# Patient Record
Sex: MALE | Race: OTHER | NOT HISPANIC OR LATINO | Employment: FULL TIME | URBAN - METROPOLITAN AREA
[De-identification: names, ages, dates, MRNs, and addresses within clinical notes are randomized per-mention and may not be internally consistent; named-entity substitution may affect disease eponyms.]

---

## 2018-09-07 ENCOUNTER — HOSPITAL ENCOUNTER (OUTPATIENT)
Facility: HOSPITAL | Age: 63
Setting detail: OBSERVATION
Discharge: HOME | End: 2018-09-10
Attending: EMERGENCY MEDICINE | Admitting: FAMILY MEDICINE
Payer: COMMERCIAL

## 2018-09-07 ENCOUNTER — APPOINTMENT (EMERGENCY)
Dept: RADIOLOGY | Facility: HOSPITAL | Age: 63
End: 2018-09-07
Payer: COMMERCIAL

## 2018-09-07 DIAGNOSIS — R07.9 CHEST PAIN: Primary | ICD-10-CM

## 2018-09-07 LAB
ALBUMIN SERPL BCP-MCNC: 3.8 G/DL (ref 3.5–5)
ALP SERPL-CCNC: 68 U/L (ref 46–116)
ALT SERPL W P-5'-P-CCNC: 51 U/L (ref 12–78)
ANION GAP SERPL CALCULATED.3IONS-SCNC: 5 MMOL/L (ref 4–13)
APTT PPP: 28 SECONDS (ref 24–36)
AST SERPL W P-5'-P-CCNC: 24 U/L (ref 5–45)
BASOPHILS # BLD AUTO: 0.03 THOUSANDS/ΜL (ref 0–0.1)
BASOPHILS NFR BLD AUTO: 0 % (ref 0–1)
BILIRUB SERPL-MCNC: 0.6 MG/DL (ref 0.2–1)
BILIRUB UR QL STRIP: NEGATIVE
BUN SERPL-MCNC: 14 MG/DL (ref 5–25)
CALCIUM SERPL-MCNC: 9 MG/DL (ref 8.3–10.1)
CHLORIDE SERPL-SCNC: 100 MMOL/L (ref 100–108)
CLARITY UR: CLEAR
CO2 SERPL-SCNC: 32 MMOL/L (ref 21–32)
COLOR UR: YELLOW
CREAT SERPL-MCNC: 1.16 MG/DL (ref 0.6–1.3)
EOSINOPHIL # BLD AUTO: 0.08 THOUSAND/ΜL (ref 0–0.61)
EOSINOPHIL NFR BLD AUTO: 1 % (ref 0–6)
ERYTHROCYTE [DISTWIDTH] IN BLOOD BY AUTOMATED COUNT: 12.6 % (ref 11.6–15.1)
GFR SERPL CREATININE-BSD FRML MDRD: 68 ML/MIN/1.73SQ M
GLUCOSE SERPL-MCNC: 171 MG/DL (ref 65–140)
GLUCOSE UR STRIP-MCNC: NEGATIVE MG/DL
HCT VFR BLD AUTO: 40.1 % (ref 36.5–49.3)
HGB BLD-MCNC: 13.6 G/DL (ref 12–17)
HGB UR QL STRIP.AUTO: NEGATIVE
IMM GRANULOCYTES # BLD AUTO: 0.02 THOUSAND/UL (ref 0–0.2)
IMM GRANULOCYTES NFR BLD AUTO: 0 % (ref 0–2)
INR PPP: 1.03 (ref 0.86–1.17)
KETONES UR STRIP-MCNC: NEGATIVE MG/DL
LEUKOCYTE ESTERASE UR QL STRIP: NEGATIVE
LIPASE SERPL-CCNC: 117 U/L (ref 73–393)
LYMPHOCYTES # BLD AUTO: 1.14 THOUSANDS/ΜL (ref 0.6–4.47)
LYMPHOCYTES NFR BLD AUTO: 12 % (ref 14–44)
MCH RBC QN AUTO: 31.6 PG (ref 26.8–34.3)
MCHC RBC AUTO-ENTMCNC: 33.9 G/DL (ref 31.4–37.4)
MCV RBC AUTO: 93 FL (ref 82–98)
MONOCYTES # BLD AUTO: 0.87 THOUSAND/ΜL (ref 0.17–1.22)
MONOCYTES NFR BLD AUTO: 9 % (ref 4–12)
NEUTROPHILS # BLD AUTO: 7.14 THOUSANDS/ΜL (ref 1.85–7.62)
NEUTS SEG NFR BLD AUTO: 78 % (ref 43–75)
NITRITE UR QL STRIP: NEGATIVE
NRBC BLD AUTO-RTO: 0 /100 WBCS
PH UR STRIP.AUTO: 5.5 [PH] (ref 4.5–8)
PLATELET # BLD AUTO: 209 THOUSANDS/UL (ref 149–390)
PLATELET # BLD AUTO: 225 THOUSANDS/UL (ref 149–390)
PMV BLD AUTO: 10.4 FL (ref 8.9–12.7)
PMV BLD AUTO: 9.8 FL (ref 8.9–12.7)
POTASSIUM SERPL-SCNC: 3.3 MMOL/L (ref 3.5–5.3)
PROT SERPL-MCNC: 7.1 G/DL (ref 6.4–8.2)
PROT UR STRIP-MCNC: NEGATIVE MG/DL
PROTHROMBIN TIME: 13.4 SECONDS (ref 11.8–14.2)
RBC # BLD AUTO: 4.3 MILLION/UL (ref 3.88–5.62)
SODIUM SERPL-SCNC: 137 MMOL/L (ref 136–145)
SP GR UR STRIP.AUTO: >=1.03 (ref 1–1.03)
TROPONIN I SERPL-MCNC: <0.02 NG/ML
TROPONIN I SERPL-MCNC: <0.02 NG/ML
UROBILINOGEN UR QL STRIP.AUTO: 0.2 E.U./DL
WBC # BLD AUTO: 9.28 THOUSAND/UL (ref 4.31–10.16)

## 2018-09-07 PROCEDURE — 85025 COMPLETE CBC W/AUTO DIFF WBC: CPT | Performed by: EMERGENCY MEDICINE

## 2018-09-07 PROCEDURE — 81003 URINALYSIS AUTO W/O SCOPE: CPT | Performed by: EMERGENCY MEDICINE

## 2018-09-07 PROCEDURE — 96374 THER/PROPH/DIAG INJ IV PUSH: CPT

## 2018-09-07 PROCEDURE — 85730 THROMBOPLASTIN TIME PARTIAL: CPT | Performed by: EMERGENCY MEDICINE

## 2018-09-07 PROCEDURE — 84484 ASSAY OF TROPONIN QUANT: CPT | Performed by: NURSE PRACTITIONER

## 2018-09-07 PROCEDURE — 85610 PROTHROMBIN TIME: CPT | Performed by: EMERGENCY MEDICINE

## 2018-09-07 PROCEDURE — 93005 ELECTROCARDIOGRAM TRACING: CPT

## 2018-09-07 PROCEDURE — 80053 COMPREHEN METABOLIC PANEL: CPT | Performed by: EMERGENCY MEDICINE

## 2018-09-07 PROCEDURE — 99285 EMERGENCY DEPT VISIT HI MDM: CPT

## 2018-09-07 PROCEDURE — 84484 ASSAY OF TROPONIN QUANT: CPT | Performed by: EMERGENCY MEDICINE

## 2018-09-07 PROCEDURE — 96361 HYDRATE IV INFUSION ADD-ON: CPT

## 2018-09-07 PROCEDURE — 36415 COLL VENOUS BLD VENIPUNCTURE: CPT | Performed by: EMERGENCY MEDICINE

## 2018-09-07 PROCEDURE — 83690 ASSAY OF LIPASE: CPT | Performed by: EMERGENCY MEDICINE

## 2018-09-07 PROCEDURE — 71046 X-RAY EXAM CHEST 2 VIEWS: CPT

## 2018-09-07 PROCEDURE — 85049 AUTOMATED PLATELET COUNT: CPT | Performed by: NURSE PRACTITIONER

## 2018-09-07 RX ORDER — ACETAMINOPHEN 325 MG/1
650 TABLET ORAL EVERY 4 HOURS PRN
Status: DISCONTINUED | OUTPATIENT
Start: 2018-09-07 | End: 2018-09-10 | Stop reason: HOSPADM

## 2018-09-07 RX ORDER — ASPIRIN 81 MG/1
81 TABLET ORAL DAILY
Status: DISCONTINUED | OUTPATIENT
Start: 2018-09-08 | End: 2018-09-10 | Stop reason: HOSPADM

## 2018-09-07 RX ORDER — ASPIRIN 81 MG/1
324 TABLET, CHEWABLE ORAL ONCE
Status: COMPLETED | OUTPATIENT
Start: 2018-09-07 | End: 2018-09-07

## 2018-09-07 RX ORDER — METOPROLOL SUCCINATE 25 MG/1
25 TABLET, EXTENDED RELEASE ORAL DAILY
Status: DISCONTINUED | OUTPATIENT
Start: 2018-09-08 | End: 2018-09-10 | Stop reason: HOSPADM

## 2018-09-07 RX ORDER — ONDANSETRON 2 MG/ML
4 INJECTION INTRAMUSCULAR; INTRAVENOUS EVERY 6 HOURS PRN
Status: DISCONTINUED | OUTPATIENT
Start: 2018-09-07 | End: 2018-09-10 | Stop reason: HOSPADM

## 2018-09-07 RX ORDER — LANSOPRAZOLE 30 MG/1
30 CAPSULE, DELAYED RELEASE ORAL DAILY
COMMUNITY

## 2018-09-07 RX ORDER — ASPIRIN 81 MG/1
81 TABLET ORAL DAILY
COMMUNITY

## 2018-09-07 RX ORDER — METOPROLOL SUCCINATE 50 MG/1
25 TABLET, EXTENDED RELEASE ORAL DAILY
COMMUNITY

## 2018-09-07 RX ORDER — ATORVASTATIN CALCIUM 40 MG/1
40 TABLET, FILM COATED ORAL DAILY
COMMUNITY

## 2018-09-07 RX ORDER — LOSARTAN POTASSIUM AND HYDROCHLOROTHIAZIDE 25; 100 MG/1; MG/1
1 TABLET ORAL DAILY
COMMUNITY

## 2018-09-07 RX ORDER — ATORVASTATIN CALCIUM 40 MG/1
40 TABLET, FILM COATED ORAL DAILY
Status: DISCONTINUED | OUTPATIENT
Start: 2018-09-08 | End: 2018-09-10 | Stop reason: HOSPADM

## 2018-09-07 RX ORDER — HEPARIN SODIUM 5000 [USP'U]/ML
5000 INJECTION, SOLUTION INTRAVENOUS; SUBCUTANEOUS EVERY 8 HOURS SCHEDULED
Status: DISCONTINUED | OUTPATIENT
Start: 2018-09-07 | End: 2018-09-10 | Stop reason: HOSPADM

## 2018-09-07 RX ORDER — PANTOPRAZOLE SODIUM 40 MG/1
40 TABLET, DELAYED RELEASE ORAL
Status: DISCONTINUED | OUTPATIENT
Start: 2018-09-08 | End: 2018-09-10 | Stop reason: HOSPADM

## 2018-09-07 RX ADMIN — ASPIRIN 81 MG 324 MG: 81 TABLET ORAL at 18:18

## 2018-09-07 RX ADMIN — SODIUM CHLORIDE 1000 ML: 0.9 INJECTION, SOLUTION INTRAVENOUS at 18:23

## 2018-09-07 RX ADMIN — FAMOTIDINE 20 MG: 10 INJECTION, SOLUTION INTRAVENOUS at 18:20

## 2018-09-07 NOTE — ED PROVIDER NOTES
History  Chief Complaint   Patient presents with    Chest Pain     Per Pt " I was feeling CP  It felt like heaviness in my mid chest area  Hx of narrow arteries " Appears to not be in distress at this time  C/o chest pain since about 2:30pm while driving - cp lasted about 2 hours  Pt  Pulled over to the side of the road  No sob, no diaphoresis, +lightheaded, +nausea  Not worse with exertion  Pt  Just finished cipro and flagyl for diverticulitis this am   Pt  Recently stopped taking prevacid  Pt  Had a exercise stress test 1 & 1/2 weeks ago which was okay as per pt  (done in [place])  Pt  Has a cardiologist in [place]  Scheduled for a nuclear stress test on 9/20/18  No h/o dvt or PE    Pt  Has a hx of HTN, hypercholesterolemia, no h/o diabetes  No smoking  No premature CAD in the family  Prior to Admission Medications   Prescriptions Last Dose Informant Patient Reported? Taking?   aspirin (ECOTRIN LOW STRENGTH) 81 mg EC tablet   Yes Yes   Sig: Take 81 mg by mouth daily   atorvastatin (LIPITOR) 40 mg tablet   Yes Yes   Sig: Take 40 mg by mouth daily   lansoprazole (PREVACID) 30 mg capsule More than a month at Unknown time  Yes No   Sig: Take 30 mg by mouth daily   losartan-hydrochlorothiazide (HYZAAR) 100-25 MG per tablet   Yes Yes   Sig: Take 1 tablet by mouth daily   metoprolol succinate (TOPROL-XL) 50 mg 24 hr tablet   Yes Yes   Sig: Take 25 mg by mouth daily      Facility-Administered Medications: None       Past Medical History:   Diagnosis Date    Arthritis     Hypertension        History reviewed  No pertinent surgical history  Family History   Problem Relation Age of Onset    Arthritis Mother     Hearing loss Mother     Heart disease Father     Cancer Maternal Aunt      I have reviewed and agree with the history as documented      Social History   Substance Use Topics    Smoking status: Former Smoker    Smokeless tobacco: Never Used    Alcohol use No        Review of Systems Constitutional: Negative for appetite change, fatigue and fever  HENT: Negative for rhinorrhea and sore throat  Eyes: Negative for pain  Respiratory: Negative for cough, shortness of breath and wheezing  Cardiovascular: Positive for chest pain  Negative for leg swelling  Gastrointestinal: Positive for nausea  Negative for abdominal pain, diarrhea and vomiting  Genitourinary: Negative for dysuria and flank pain  Musculoskeletal: Negative for back pain and neck pain  Skin: Negative for rash  Neurological: Positive for light-headedness  Negative for syncope and headaches  Psychiatric/Behavioral:        Mood normal       Physical Exam  Physical Exam   Constitutional: He is oriented to person, place, and time  He appears well-developed and well-nourished  HENT:   Head: Normocephalic and atraumatic  Neck: Normal range of motion  Neck supple  Cardiovascular: Normal rate and regular rhythm  Pulmonary/Chest: Effort normal and breath sounds normal    Abdominal: Soft  There is no tenderness  Musculoskeletal: Normal range of motion  Neurological: He is alert and oriented to person, place, and time  Skin: Skin is warm and dry  Nursing note and vitals reviewed        Vital Signs  ED Triage Vitals [09/07/18 1736]   Temperature Pulse Respirations Blood Pressure SpO2   98 °F (36 7 °C) 75 16 140/66 96 %      Temp Source Heart Rate Source Patient Position - Orthostatic VS BP Location FiO2 (%)   Oral Monitor Sitting Right arm --      Pain Score       2           Vitals:    09/09/18 2300 09/10/18 0300 09/10/18 0700 09/10/18 1100   BP: 141/80 143/73 138/75 148/80   Pulse: 57 56 65 77   Patient Position - Orthostatic VS: Lying Lying Lying Lying       Visual Acuity      ED Medications  Medications   sodium chloride 0 9 % bolus 1,000 mL (0 mL Intravenous Stopped 9/7/18 1923)   famotidine (PEPCID) injection 20 mg (20 mg Intravenous Given 9/7/18 1820)   aspirin chewable tablet 324 mg (324 mg Oral Given 9/7/18 1818)   potassium chloride (K-DUR,KLOR-CON) CR tablet 40 mEq (40 mEq Oral Given 9/8/18 0523)   regadenoson (LEXISCAN) injection 0 4 mg (0 4 mg Intravenous Given 9/10/18 1134)       Diagnostic Studies  Results Reviewed     Procedure Component Value Units Date/Time    Troponin I [[id number]]  (Normal) Collected:  09/08/18 0446    Lab Status:  Final result Specimen:  Blood from Arm, Left Updated:  09/08/18 0518     Troponin I <0 02 ng/mL     Basic metabolic panel [[id number]]  (Abnormal) Collected:  09/08/18 0446    Lab Status:  Final result Specimen:  Blood from Arm, Left Updated:  09/08/18 0518     Sodium 140 mmol/L      Potassium 3 7 mmol/L      Chloride 104 mmol/L      CO2 33 (H) mmol/L      ANION GAP 3 (L) mmol/L      BUN 12 mg/dL      Creatinine 1 00 mg/dL      Glucose 106 mg/dL      Calcium 9 3 mg/dL      eGFR 81 ml/min/1 73sq m     Narrative:         National Kidney Disease Education Program recommendations are as follows:  GFR calculation is accurate only with a steady state creatinine  Chronic Kidney disease less than 60 ml/min/1 73 sq  meters  Kidney failure less than 15 ml/min/1 73 sq  meters      Magnesium [[id number]]  (Normal) Collected:  09/08/18 0446    Lab Status:  Final result Specimen:  Blood from Arm, Left Updated:  09/08/18 0518     Magnesium 2 1 mg/dL     Phosphorus [[id number]]  (Normal) Collected:  09/08/18 0446    Lab Status:  Final result Specimen:  Blood from Arm, Left Updated:  09/08/18 0518     Phosphorus 3 8 mg/dL     Lipid Panel with Direct LDL reflex [[id number]]  (Normal) Collected:  09/08/18 0446    Lab Status:  Final result Specimen:  Blood from Arm, Left Updated:  09/08/18 0518     Cholesterol 118 mg/dL      Triglycerides 99 mg/dL      HDL, Direct 40 mg/dL      LDL Calculated 58 mg/dL     CBC and differential [[id number]] Collected:  09/08/18 0446    Lab Status:  Final result Specimen:  Blood from Arm, Left Updated:  09/08/18 0456     WBC 7 46 Thousand/uL      RBC 4 16 Million/uL Hemoglobin 13 1 g/dL      Hematocrit 38 8 %      MCV 93 fL      MCH 31 5 pg      MCHC 33 8 g/dL      RDW 12 6 %      MPV 9 9 fL      Platelets 112 Thousands/uL      nRBC 0 /100 WBCs      Neutrophils Relative 64 %      Immat GRANS % 0 %      Lymphocytes Relative 24 %      Monocytes Relative 10 %      Eosinophils Relative 2 %      Basophils Relative 0 %      Neutrophils Absolute 4 81 Thousands/µL      Immature Grans Absolute 0 02 Thousand/uL      Lymphocytes Absolute 1 75 Thousands/µL      Monocytes Absolute 0 71 Thousand/µL      Eosinophils Absolute 0 14 Thousand/µL      Basophils Absolute 0 03 Thousands/µL     Troponin I [[id number]]  (Normal) Collected:  09/07/18 2311    Lab Status:  Final result Specimen:  Blood from Arm, Right Updated:  09/07/18 2338     Troponin I <0 02 ng/mL     Platelet count [[id number]]  (Normal) Collected:  09/07/18 2311    Lab Status:  Final result Specimen:  Blood from Arm, Right Updated:  09/07/18 2317     Platelets 372 Thousands/uL      MPV 9 8 fL     UA w Reflex to Microscopic [[id number]] Collected:  09/07/18 1931    Lab Status:  Final result Specimen:  Urine from Urine, Clean Catch Updated:  09/07/18 1940     Color, UA Yellow     Clarity, UA Clear     Specific Gravity, UA >=1 030     pH, UA 5 5     Leukocytes, UA Negative     Nitrite, UA Negative     Protein, UA Negative mg/dl      Glucose, UA Negative mg/dl      Ketones, UA Negative mg/dl      Urobilinogen, UA 0 2 E U /dl      Bilirubin, UA Negative     Blood, UA Negative    Comprehensive metabolic panel [[id number]]  (Abnormal) Collected:  09/07/18 1820    Lab Status:  Final result Specimen:  Blood from Arm, Right Updated:  09/07/18 1848     Sodium 137 mmol/L      Potassium 3 3 (L) mmol/L      Chloride 100 mmol/L      CO2 32 mmol/L      ANION GAP 5 mmol/L      BUN 14 mg/dL      Creatinine 1 16 mg/dL      Glucose 171 (H) mg/dL      Calcium 9 0 mg/dL      AST 24 U/L      ALT 51 U/L      Alkaline Phosphatase 68 U/L      Total Protein 7 1 g/dL Albumin 3 8 g/dL      Total Bilirubin 0 60 mg/dL      eGFR 68 ml/min/1 73sq m     Narrative:         National Kidney Disease Education Program recommendations are as follows:  GFR calculation is accurate only with a steady state creatinine  Chronic Kidney disease less than 60 ml/min/1 73 sq  meters  Kidney failure less than 15 ml/min/1 73 sq  meters      Lipase [[id number]]  (Normal) Collected:  09/07/18 1820    Lab Status:  Final result Specimen:  Blood from Arm, Right Updated:  09/07/18 1848     Lipase 117 u/L     Troponin I [[id number]]  (Normal) Collected:  09/07/18 1820    Lab Status:  Final result Specimen:  Blood from Arm, Right Updated:  09/07/18 1848     Troponin I <0 02 ng/mL     Protime-INR [[id number]]  (Normal) Collected:  09/07/18 1820    Lab Status:  Final result Specimen:  Blood from Arm, Right Updated:  09/07/18 1841     Protime 13 4 seconds      INR 1 03    APTT [[id number]]  (Normal) Collected:  09/07/18 1820    Lab Status:  Final result Specimen:  Blood from Arm, Right Updated:  09/07/18 1841     PTT 28 seconds     CBC and differential [[id number]]  (Abnormal) Collected:  09/07/18 1820    Lab Status:  Final result Specimen:  Blood from Arm, Right Updated:  09/07/18 1829     WBC 9 28 Thousand/uL      RBC 4 30 Million/uL      Hemoglobin 13 6 g/dL      Hematocrit 40 1 %      MCV 93 fL      MCH 31 6 pg      MCHC 33 9 g/dL      RDW 12 6 %      MPV 10 4 fL      Platelets 781 Thousands/uL      nRBC 0 /100 WBCs      Neutrophils Relative 78 (H) %      Immat GRANS % 0 %      Lymphocytes Relative 12 (L) %      Monocytes Relative 9 %      Eosinophils Relative 1 %      Basophils Relative 0 %      Neutrophils Absolute 7 14 Thousands/µL      Immature Grans Absolute 0 02 Thousand/uL      Lymphocytes Absolute 1 14 Thousands/µL      Monocytes Absolute 0 87 Thousand/µL      Eosinophils Absolute 0 08 Thousand/µL      Basophils Absolute 0 03 Thousands/µL                  XR chest 2 views   Final Result by Esther Nielsen MD (09/07 2024)      No acute cardiopulmonary disease  Workstation performed: [id number]                    Procedures  Procedures       Phone Contacts  ED Phone Contact    ED Course         HEART Risk Score      Most Recent Value   History  2 Filed at: 09/07/2018 2118   ECG  1 Filed at: 09/07/2018 2118   Age  1 Filed at: 09/07/2018 2118   Risk Factors  2 Filed at: 09/07/2018 2118   Troponin  0 Filed at: 09/07/2018 2118   Heart Score Risk Calculator   History  2 Filed at: 09/07/2018 2118   ECG  1 Filed at: 09/07/2018 2118   Age  1 Filed at: 09/07/2018 2118   Risk Factors  2 Filed at: 09/07/2018 2118   Troponin  0 Filed at: 09/07/2018 2118   HEART Score  6 Filed at: 09/07/2018 2118   HEART Score  6 Filed at: 09/07/2018 2118                            MDM  Number of Diagnoses or Management Options  Chest pain:      Amount and/or Complexity of Data Reviewed  Clinical lab tests: ordered and reviewed  Tests in the radiology section of CPT®: ordered and reviewed    Risk of Complications, Morbidity, and/or Mortality  Presenting problems: moderate  General comments: Patient was admitted for further workup      CritCare Time    Disposition  Final diagnoses:   Chest pain     Time reflects when diagnosis was documented in both MDM as applicable and the Disposition within this note     Time User Action Codes Description Comment    9/7/2018  7:04 PM Donovan Beebe 6 [R07 9] Chest pain       ED Disposition     ED Disposition Condition Comment    Admit  Case was discussed with Herline and the patient's admission status was agreed to be Admission Status: observation status to the service of Dr Geovani Alicea           Follow-up Information     Follow up With Specialties Details Why Contact Info    [facility] [address]  and your cardiologist [phone]          Discharge Medication List as of 9/10/2018  3:53 PM CONTINUE these medications which have NOT CHANGED    Details   aspirin (ECOTRIN LOW STRENGTH) 81 mg EC tablet Take 81 mg by mouth daily, Historical Med      atorvastatin (LIPITOR) 40 mg tablet Take 40 mg by mouth daily, Historical Med      losartan-hydrochlorothiazide (HYZAAR) 100-25 MG per tablet Take 1 tablet by mouth daily, Historical Med      metoprolol succinate (TOPROL-XL) 50 mg 24 hr tablet Take 25 mg by mouth daily, Historical Med      lansoprazole (PREVACID) 30 mg capsule Take 30 mg by mouth daily, Historical Med           No discharge procedures on file      ED Provider  Electronically Signed by           Chary Choi MD  09/12/18 3020

## 2018-09-07 NOTE — DISCHARGE INSTRUCTIONS

## 2018-09-08 PROBLEM — K21.9 GERD (GASTROESOPHAGEAL REFLUX DISEASE): Status: ACTIVE | Noted: 2018-09-08

## 2018-09-08 PROBLEM — E87.6 HYPOKALEMIA: Status: ACTIVE | Noted: 2018-09-08

## 2018-09-08 PROBLEM — I10 HYPERTENSION: Status: ACTIVE | Noted: 2018-09-08

## 2018-09-08 PROBLEM — R07.9 CHEST PAIN: Status: ACTIVE | Noted: 2018-09-08

## 2018-09-08 LAB
ANION GAP SERPL CALCULATED.3IONS-SCNC: 3 MMOL/L (ref 4–13)
ATRIAL RATE: 75 BPM
BASOPHILS # BLD AUTO: 0.03 THOUSANDS/ΜL (ref 0–0.1)
BASOPHILS NFR BLD AUTO: 0 % (ref 0–1)
BUN SERPL-MCNC: 12 MG/DL (ref 5–25)
CALCIUM SERPL-MCNC: 9.3 MG/DL (ref 8.3–10.1)
CHLORIDE SERPL-SCNC: 104 MMOL/L (ref 100–108)
CHOLEST SERPL-MCNC: 118 MG/DL (ref 50–200)
CO2 SERPL-SCNC: 33 MMOL/L (ref 21–32)
CREAT SERPL-MCNC: 1 MG/DL (ref 0.6–1.3)
EOSINOPHIL # BLD AUTO: 0.14 THOUSAND/ΜL (ref 0–0.61)
EOSINOPHIL NFR BLD AUTO: 2 % (ref 0–6)
ERYTHROCYTE [DISTWIDTH] IN BLOOD BY AUTOMATED COUNT: 12.6 % (ref 11.6–15.1)
GFR SERPL CREATININE-BSD FRML MDRD: 81 ML/MIN/1.73SQ M
GLUCOSE SERPL-MCNC: 106 MG/DL (ref 65–140)
HCT VFR BLD AUTO: 38.8 % (ref 36.5–49.3)
HDLC SERPL-MCNC: 40 MG/DL (ref 40–60)
HGB BLD-MCNC: 13.1 G/DL (ref 12–17)
IMM GRANULOCYTES # BLD AUTO: 0.02 THOUSAND/UL (ref 0–0.2)
IMM GRANULOCYTES NFR BLD AUTO: 0 % (ref 0–2)
LDLC SERPL CALC-MCNC: 58 MG/DL (ref 0–100)
LYMPHOCYTES # BLD AUTO: 1.75 THOUSANDS/ΜL (ref 0.6–4.47)
LYMPHOCYTES NFR BLD AUTO: 24 % (ref 14–44)
MAGNESIUM SERPL-MCNC: 2.1 MG/DL (ref 1.6–2.6)
MCH RBC QN AUTO: 31.5 PG (ref 26.8–34.3)
MCHC RBC AUTO-ENTMCNC: 33.8 G/DL (ref 31.4–37.4)
MCV RBC AUTO: 93 FL (ref 82–98)
MONOCYTES # BLD AUTO: 0.71 THOUSAND/ΜL (ref 0.17–1.22)
MONOCYTES NFR BLD AUTO: 10 % (ref 4–12)
NEUTROPHILS # BLD AUTO: 4.81 THOUSANDS/ΜL (ref 1.85–7.62)
NEUTS SEG NFR BLD AUTO: 64 % (ref 43–75)
NRBC BLD AUTO-RTO: 0 /100 WBCS
P AXIS: 74 DEGREES
PHOSPHATE SERPL-MCNC: 3.8 MG/DL (ref 2.3–4.1)
PLATELET # BLD AUTO: 198 THOUSANDS/UL (ref 149–390)
PMV BLD AUTO: 9.9 FL (ref 8.9–12.7)
POTASSIUM SERPL-SCNC: 3.7 MMOL/L (ref 3.5–5.3)
PR INTERVAL: 198 MS
QRS AXIS: 45 DEGREES
QRSD INTERVAL: 92 MS
QT INTERVAL: 368 MS
QTC INTERVAL: 410 MS
RBC # BLD AUTO: 4.16 MILLION/UL (ref 3.88–5.62)
SODIUM SERPL-SCNC: 140 MMOL/L (ref 136–145)
T WAVE AXIS: 64 DEGREES
TRIGL SERPL-MCNC: 99 MG/DL
TROPONIN I SERPL-MCNC: <0.02 NG/ML
VENTRICULAR RATE: 75 BPM
WBC # BLD AUTO: 7.46 THOUSAND/UL (ref 4.31–10.16)

## 2018-09-08 PROCEDURE — 83735 ASSAY OF MAGNESIUM: CPT | Performed by: NURSE PRACTITIONER

## 2018-09-08 PROCEDURE — 84484 ASSAY OF TROPONIN QUANT: CPT | Performed by: NURSE PRACTITIONER

## 2018-09-08 PROCEDURE — 93010 ELECTROCARDIOGRAM REPORT: CPT | Performed by: INTERNAL MEDICINE

## 2018-09-08 PROCEDURE — 99219 PR INITIAL OBSERVATION CARE/DAY 50 MINUTES: CPT | Performed by: FAMILY MEDICINE

## 2018-09-08 PROCEDURE — 80048 BASIC METABOLIC PNL TOTAL CA: CPT | Performed by: NURSE PRACTITIONER

## 2018-09-08 PROCEDURE — 80061 LIPID PANEL: CPT | Performed by: NURSE PRACTITIONER

## 2018-09-08 PROCEDURE — 85025 COMPLETE CBC W/AUTO DIFF WBC: CPT | Performed by: NURSE PRACTITIONER

## 2018-09-08 PROCEDURE — 84100 ASSAY OF PHOSPHORUS: CPT | Performed by: NURSE PRACTITIONER

## 2018-09-08 PROCEDURE — 99244 OFF/OP CNSLTJ NEW/EST MOD 40: CPT | Performed by: INTERNAL MEDICINE

## 2018-09-08 RX ORDER — POTASSIUM CHLORIDE 20 MEQ/1
40 TABLET, EXTENDED RELEASE ORAL ONCE
Status: COMPLETED | OUTPATIENT
Start: 2018-09-08 | End: 2018-09-08

## 2018-09-08 RX ORDER — LANOLIN ALCOHOL/MO/W.PET/CERES
6 CREAM (GRAM) TOPICAL
Status: DISCONTINUED | OUTPATIENT
Start: 2018-09-08 | End: 2018-09-10 | Stop reason: HOSPADM

## 2018-09-08 RX ADMIN — LOSARTAN POTASSIUM: 50 TABLET, FILM COATED ORAL at 09:19

## 2018-09-08 RX ADMIN — ACETAMINOPHEN 650 MG: 325 TABLET, FILM COATED ORAL at 23:51

## 2018-09-08 RX ADMIN — HEPARIN SODIUM 5000 UNITS: 5000 INJECTION, SOLUTION INTRAVENOUS; SUBCUTANEOUS at 21:33

## 2018-09-08 RX ADMIN — PANTOPRAZOLE SODIUM 40 MG: 40 TABLET, DELAYED RELEASE ORAL at 05:25

## 2018-09-08 RX ADMIN — MELATONIN TAB 3 MG 6 MG: 3 TAB at 00:48

## 2018-09-08 RX ADMIN — HEPARIN SODIUM 5000 UNITS: 5000 INJECTION, SOLUTION INTRAVENOUS; SUBCUTANEOUS at 15:47

## 2018-09-08 RX ADMIN — PSYLLIUM HUSK 1 PACKET: 3.4 POWDER ORAL at 21:33

## 2018-09-08 RX ADMIN — HEPARIN SODIUM 5000 UNITS: 5000 INJECTION, SOLUTION INTRAVENOUS; SUBCUTANEOUS at 00:48

## 2018-09-08 RX ADMIN — POTASSIUM CHLORIDE 40 MEQ: 1500 TABLET, EXTENDED RELEASE ORAL at 05:23

## 2018-09-08 RX ADMIN — ASPIRIN 81 MG: 81 TABLET, COATED ORAL at 09:20

## 2018-09-08 RX ADMIN — HEPARIN SODIUM 5000 UNITS: 5000 INJECTION, SOLUTION INTRAVENOUS; SUBCUTANEOUS at 05:23

## 2018-09-08 RX ADMIN — MELATONIN TAB 3 MG 6 MG: 3 TAB at 21:34

## 2018-09-08 RX ADMIN — ATORVASTATIN CALCIUM 40 MG: 40 TABLET, FILM COATED ORAL at 09:20

## 2018-09-08 NOTE — PLAN OF CARE
CARDIOVASCULAR - ADULT     Maintains optimal cardiac output and hemodynamic stability Progressing     Absence of cardiac dysrhythmias or at baseline rhythm Progressing        DISCHARGE PLANNING     Discharge to home or other facility with appropriate resources Progressing        INFECTION - ADULT     Absence or prevention of progression during hospitalization Progressing     Absence of fever/infection during neutropenic period Progressing        Knowledge Deficit     Patient/family/caregiver demonstrates understanding of disease process, treatment plan, medications, and discharge instructions Progressing        METABOLIC, FLUID AND ELECTROLYTES - ADULT     Electrolytes maintained within normal limits Progressing     Fluid balance maintained Progressing     Glucose maintained within target range Progressing        MUSCULOSKELETAL - ADULT     Maintain or return mobility to safest level of function Progressing     Maintain proper alignment of affected body part Progressing        NEUROSENSORY - ADULT     Achieves stable or improved neurological status Progressing     Achieves maximal functionality and self care Progressing        PAIN - ADULT     Verbalizes/displays adequate comfort level or baseline comfort level Progressing        Potential for Falls     Patient will remain free of falls Progressing        RESPIRATORY - ADULT     Achieves optimal ventilation and oxygenation Progressing        SAFETY ADULT     Maintain or return to baseline ADL function Progressing     Maintain or return mobility status to optimal level Progressing        SKIN/TISSUE INTEGRITY - ADULT     Skin integrity remains intact Progressing     Incision(s), wounds(s) or drain site(s) healing without S/S of infection Progressing     Oral mucous membranes remain intact Progressing

## 2018-09-08 NOTE — ASSESSMENT & PLAN NOTE
Pre hospital, chest pressure nonradiating, associated with nausea and dizziness  Patient does have previous history of chest pain, with recent abnormal stress test, scheduled for nuclear test   Trop negative, will trend  Chest x-ray reviewed, no acute findings  Telemetry  Will hold echo pending cardiology consult  Consult Cardiology for further evaluation and management  Monitor associated risk factors

## 2018-09-08 NOTE — H&P
H&P- Quentin Rodriguez 1955, 66 y o  male MRN: [id number]    Unit/Bed#: -01 Encounter: [id number]    Primary Care Provider: No primary care provider on file  Date and time admitted to hospital: 9/7/2018  5:35 PM        * Chest pain   Assessment & Plan    Pre hospital, chest pressure nonradiating, associated with nausea and dizziness  Patient does have previous history of chest pain, with recent abnormal stress test, scheduled for nuclear test   Trop negative, will trend  Chest x-ray reviewed, no acute findings  Telemetry  Will hold echo pending cardiology consult  Consult Cardiology for further evaluation and management  Monitor associated risk factors  Hypokalemia   Assessment & Plan    Potassium 3 3 on admission  Will replete  Monitor electrolytes in the a keyona Lopes [place] Hypertension   Assessment & Plan    Blood pressure stable  Continue Lopressor Hyzaar  Continue to monitor  VTE Prophylaxis: Heparin  / sequential compression device   Code Status: Full Code  POLST: POLST form is not discussed and not completed at this time  Discussion with family: No Family at bed side    Anticipated Length of Stay:  Patient will be admitted on an Observation basis with an anticipated length of stay of  Less than 2 midnights  Justification for Hospital Stay: Chest Pain R/O [facility], [place], operated by [facility]     Total Time for Visit, including Counseling / Coordination of Care: 45 minutes  Greater than 50% of this total time spent on direct patient counseling and coordination of care  Chief Complaint:   Chest Pain    History of Present Illness:    Quentin Rodriguez is a 66 y o  male with HTN who presents with chest pain, pressure nonradiating associated with nausea and dizziness  Patient states pain was "intense, had to pull over to call 911   patient denies shortness of breath, nausea, diaphoresis    Patient reports has had similar pain in the past recent stress test abnormal, scheduled for nuclear test  Patient report extensive cardiac workup  Review of Systems:    Review of Systems   Respiratory: Negative for shortness of breath  Cardiovascular: Positive for chest pain  Negative for palpitations and leg swelling  Gastrointestinal: Positive for nausea  Neurological: Positive for dizziness  All other systems reviewed and are negative  Past Medical and Surgical History:     Past Medical History:   Diagnosis Date    Arthritis     Hypertension        History reviewed  No pertinent surgical history  Meds/Allergies:    Prior to Admission medications    Medication Sig Start Date End Date Taking? Authorizing Provider   aspirin (ECOTRIN LOW STRENGTH) 81 mg EC tablet Take 81 mg by mouth daily   Yes Historical Provider, MD   atorvastatin (LIPITOR) 40 mg tablet Take 40 mg by mouth daily   Yes Historical Provider, MD   losartan-hydrochlorothiazide (HYZAAR) 100-25 MG per tablet Take 1 tablet by mouth daily   Yes Historical Provider, MD   metoprolol succinate (TOPROL-XL) 50 mg 24 hr tablet Take 25 mg by mouth daily   Yes Historical Provider, MD   lansoprazole (PREVACID) 30 mg capsule Take 30 mg by mouth daily    Historical Provider, MD     I have reviewed home medications with patient personally  Allergies: No Known Allergies    Social History:     Marital Status: /Civil Union   Occupation: Full time employee  Patient Pre-hospital Living Situation: Home from out of town    Patient Pre-hospital Level of Mobility: Independent  Patient Pre-hospital Diet Restrictions: none  Substance Use History:   History   Alcohol Use No     History   Smoking Status    Former Smoker   Smokeless Tobacco    Never Used     History   Drug Use No       Family History:    non-contributory    Physical Exam:     Vitals:   Blood Pressure: 130/75 (09/07/18 2240)  Pulse: 60 (09/07/18 2236)  Temperature: 97 8 °F (36 6 °C) (09/07/18 2236)  Temp Source: Oral (09/07/18 2236)  Respirations: 18 (09/07/18 2236)  Height: 6' 5" (195 6 cm) (09/07/18 2236)  Weight - Scale: 104 kg (230 lb) (09/07/18 1736)  SpO2: 97 % (09/07/18 2236)    Physical Exam   Constitutional: He is oriented to person, place, and time  He appears well-developed and well-nourished  No distress  HENT:   Head: Normocephalic and atraumatic  Mouth/Throat: Oropharynx is clear and moist    Neck: Normal range of motion  Neck supple  No thyromegaly present  Cardiovascular: Normal rate, regular rhythm, normal heart sounds and intact distal pulses  No murmur heard  ressolved   Pulmonary/Chest: Effort normal and breath sounds normal  No respiratory distress  He has no wheezes  He exhibits no tenderness  Abdominal: Soft  Bowel sounds are normal  He exhibits no distension  There is no tenderness  Musculoskeletal: Normal range of motion  He exhibits no edema or tenderness  Lymphadenopathy:     He has no cervical adenopathy  Neurological: He is alert and oriented to person, place, and time  Skin: Skin is warm and dry  He is not diaphoretic  Psychiatric: He has a normal mood and affect  Nursing note and vitals reviewed  Additional Data:     Lab Results: I have personally reviewed pertinent reports  Results from last 7 days  Lab Units 09/07/18  2311 09/07/18  1820   WBC Thousand/uL  --  9 28   HEMOGLOBIN g/dL  --  13 6   HEMATOCRIT %  --  40 1   PLATELETS Thousands/uL 209 225   NEUTROS PCT %  --  78*   LYMPHS PCT %  --  12*   MONOS PCT %  --  9   EOS PCT %  --  1       Results from last 7 days  Lab Units 09/07/18  1820   SODIUM mmol/L 137   POTASSIUM mmol/L 3 3*   CHLORIDE mmol/L 100   CO2 mmol/L 32   BUN mg/dL 14   CREATININE mg/dL 1 16   CALCIUM mg/dL 9 0   ALK PHOS U/L 68   ALT U/L 51   AST U/L 24       Results from last 7 days  Lab Units 09/07/18  1820   INR  1 03               Imaging: I have personally reviewed pertinent reports  XR chest 2 views   Final Result by Lexi Rowell MD (09/07 2024)      No acute cardiopulmonary disease  Workstation performed: [id number]             EKG, Pathology, and Other Studies Reviewed on Admission:   · EKG: Normal Sinus    Allscripts / Epic Records Reviewed: Yes     ** Please Note: This note has been constructed using a voice recognition system   **

## 2018-09-08 NOTE — CASE MANAGEMENT
Initial Clinical Review    Admission: Date/Time/Statement: 9/7/18 @ 2119 OBSERVATION    Orders Placed This Encounter   Procedures    Place in Observation (expected length of stay for this patient is less than two midnights)     Standing Status:   Standing     Number of Occurrences:   1     Order Specific Question:   Admitting Physician     Answer:   Rufus Cranker     Order Specific Question:   Level of Care     Answer:   Med Surg [16]     ED: Date/Time/Mode of Arrival:   ED Arrival Information     Expected Arrival Acuity Means of Arrival Escorted By Service Admission Type    - 9/7/2018 17:34 Urgent Ambulance [address] Urgent    Arrival Complaint    -          Chief Complaint:   Chief Complaint   Patient presents with    Chest Pain     Per Pt " I was feeling CP  It felt like heaviness in my mid chest area  Hx of narrow arteries " Appears to not be in distress at this time  History of Cindy Vaughan is a 66 y o  male with HTN who presents with chest pain, pressure nonradiating associated with nausea and dizziness  Patient states pain was "intense, had to pull over to call 911   patient denies shortness of breath, nausea, diaphoresis    Patient reports has had similar pain in the past recent stress test abnormal, scheduled for nuclear test   Patient report extensive cardiac workup          ED Vital Signs:   ED Triage Vitals [09/07/18 1736]   Temperature Pulse Respirations Blood Pressure SpO2   98 °F (36 7 °C) 75 16 140/66 96 %      Temp Source Heart Rate Source Patient Position - Orthostatic VS BP Location FiO2 (%)   Oral Monitor Sitting Right arm --      Pain Score       2        Wt Readings from Last 1 Encounters:   09/07/18 104 kg (230 lb)       Vital Signs: WNL    Abnormal Labs/Diagnostic Test Results:     Troponin = <0 02, <0 02, Potassium = 3 3, Glucose = 171  EKG: NSR, 75 BPM    Stress Test: pending    Chest x-ray: WNL    ED Treatment:   Medication Administration from 09/07/2018 1734 to 09/07/2018 2235       Date/Time Order Dose Route Action     09/07/2018 1823 sodium chloride 0 9 % bolus 1,000 mL 1,000 mL Intravenous New Bag     09/07/2018 1820 famotidine (PEPCID) injection 20 mg 20 mg Intravenous Given     09/07/2018 1818 aspirin chewable tablet 324 mg 324 mg Oral Given          Past Medical/Surgical History:       Past Medical History:   Diagnosis Date    Arthritis     Hypertension        Admitting Diagnosis: Chest pain [R07 9]    Age/Sex: 66 y o  male    Assessment/Plan:   Chest pain   Assessment & Plan     Pre hospital, chest pressure nonradiating, associated with nausea and dizziness  Patient does have previous history of chest pain, with recent abnormal stress test, scheduled for nuclear test   Trop negative, will trend  Chest x-ray reviewed, no acute findings  Telemetry  Will hold echo pending cardiology consult  Consult Cardiology for further evaluation and management  Monitor associated risk factors              Hypokalemia   Assessment & Plan     Potassium 3 3 on admission  Will replete  Monitor electrolytes in the a m             Hypertension   Assessment & Plan     Blood pressure stable  Continue Lopressor, Hyzaar  Continue to monitor              VTE Prophylaxis: Heparin  / sequential compression device   Code Status: Full Code  POLST: POLST form is not discussed and not completed at this time  Discussion with family: No Family at bed side     Anticipated Length of Stay:  Patient will be admitted on an Observation basis with an anticipated length of stay of  Less than 2 midnights  Justification for Hospital Stay: Chest Pain R/O [facility]     Admission Orders:  Cardiology consult, stress test, continuous pulse oximetry, telemetry, activity as tolerated, sequential compression device      Scheduled Meds:   Current Facility-Administered Medications:  acetaminophen 650 mg Oral Q4H PRN   aspirin 81 mg Oral Daily   atorvastatin 40 mg Oral Daily heparin (porcine) 5,000 Units Subcutaneous Q8H [address]   losartan potassium-hydrochlorothiazide (HYZAAR 100/25) combo dose  Oral Daily   melatonin 6 mg Oral HS   metoprolol succinate 25 mg Oral Daily   ondansetron 4 mg Intravenous Q6H PRN   pantoprazole 40 mg Oral Early Morning     =====================================================  9/8 Cardiology Consult:    - Nuclear stress test for Monday to r/o coronary ischemia  -Continue ASA, statin, Toprol     ===========================================================  Continued Stay Review    Date: 9/8/18 @ 1507    Vital Signs: /72 (BP Location: Left arm)   Pulse 71   Temp 98 2 °F (36 8 °C) (Oral)   Resp 18   Ht 6' 5" (1 956 m)   Wt 104 kg (230 lb)   SpO2 95%   BMI 27 27 kg/m²     Medications:   Scheduled Meds:   Current Facility-Administered Medications:  acetaminophen 650 mg Oral Q4H PRN   aspirin 81 mg Oral Daily   atorvastatin 40 mg Oral Daily   heparin (porcine) 5,000 Units Subcutaneous Q8H [address]   losartan potassium-hydrochlorothiazide (HYZAAR 100/25) combo dose  Oral Daily   melatonin 6 mg Oral HS   metoprolol succinate 25 mg Oral Daily   ondansetron 4 mg Intravenous Q6H PRN   pantoprazole 40 mg Oral Early Morning     Abnormal Labs/Diagnostic Results:     Troponin = <0 02    Age/Sex: 66 y o  male     Assessment/Plan: 9/8/18 @ 1507    Discharge Plan: TBD                    Thank you,  [address]  Utilization Review Department  Phone: [phone]; Fax [phone]  ATTENTION: Please call with any questions or concerns to [phone]  and carefully follow the prompts so that you are directed to the right person  Send all requests for admission clinical reviews, approved or denied determinations and any other requests to fax [phone]   All voicemails are confidential

## 2018-09-08 NOTE — CONSULTS
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 who has a history of hypertension, hyperlipidemia, family history of CAD presenting with chest pain since yesterday  Patient states that he was driving when he felt a substernal chest pain as if something is "surging up his chest and to his head "  Chest pain nonradiating, lasting about an hour, intermittent and waxing and waning in intensity  Patient pulled over and called 911 and was brought to the emergency department via ambulance  Chest pain relieved with Nexium  Denies SOB, lightheadedness, palpitations, leg swelling, syncope  Patient has no prior cardiac history, however states that he had an echocardiogram and exercise stress test within the last year  He states that he was told that his echocardiogram was fine, however his exercise stress test was normal and he was scheduled for a nuclear stress test  Father had CAD, diagnosed at age 62s  REVIEW OF SYSTEMS:  Constitutional:  Denies fever or chills   Eyes:  Denies change in visual acuity   HENT:  Denies nasal congestion or sore throat   Respiratory:  Denies cough or shortness of breath   Cardiovascular:  +chest pain   Denies edema   GI:  Denies abdominal pain, nausea, vomiting, bloody stools or diarrhea   :  Denies dysuria, frequency, difficulty in micturition and nocturia  Musculoskeletal:  Denies back pain or joint pain   Neurologic:  Denies headache, focal weakness or sensory changes   Endocrine:  Denies polyuria or polydipsia   Lymphatic:  Denies swollen glands   Psychiatric:  Denies depression or anxiety     Historical Information   Past Medical History:   Diagnosis Date    Arthritis     Hypertension      History reviewed  No pertinent surgical history  History   Alcohol Use No     History   Drug Use No     History   Smoking Status    Former Smoker   Smokeless Tobacco    Never Used       Family History:   Family History   Problem Relation Age of Onset    Arthritis Mother     Hearing loss Mother     Heart disease Father     Cancer Maternal Aunt        MEDS & ALLERGIES:  all current active meds have been reviewed and current meds: Current Facility-Administered Medications   Medication Dose Route Frequency    acetaminophen (TYLENOL) tablet 650 mg  650 mg Oral Q4H PRN    aspirin (ECOTRIN LOW STRENGTH) EC tablet 81 mg  81 mg Oral Daily    atorvastatin (LIPITOR) tablet 40 mg  40 mg Oral Daily    heparin (porcine) subcutaneous injection 5,000 Units  5,000 Units Subcutaneous Q8H [address]    losartan potassium-hydrochlorothiazide (HYZAAR 100/25) combo dose   Oral Daily    melatonin tablet 6 mg  6 mg Oral HS    metoprolol succinate (TOPROL-XL) 24 hr tablet 25 mg  25 mg Oral Daily    ondansetron (ZOFRAN) injection 4 mg  4 mg Intravenous Q6H PRN    pantoprazole (PROTONIX) EC tablet 40 mg  40 mg Oral Early Morning        No Known Allergies    OBJECTIVE:  Vitals:   Vitals:    09/08/18 0700   BP: 155/75   Pulse: 62   Resp: 18   Temp: 97 7 °F (36 5 °C)   SpO2: 95%     Body mass index is 27 27 kg/m²      Systolic (44KCQ), NFT:995 , Min:130 , FWA:048     Diastolic (91QJQ), UAT:86, Min:66, Max:82      Intake/Output Summary (Last 24 hours) at 09/08/18 0828  Last data filed at 09/07/18 1923   Gross per 24 hour   Intake             1000 ml   Output                0 ml   Net             1000 ml     Weight (last 2 days)     Date/Time   Weight    09/07/18 1736  104 (230)            Invasive Devices     Peripheral Intravenous Line            Peripheral IV 09/07/18 Right Antecubital less than 1 day                PHYSICAL EXAMS:  General: Patient is not in acute distress, laying in the bed comfortably, awake, alert responding to commands  Head: Normocephalic, Atraumatic  HEENT: White sclera, pink conjunctiva,  PERRLA,pharynx benign  Neck:  Supple, no neck vein distention, carotids+2/+2 no bruits, thyromegaly, adenopathy  Respiratory: clear to P/A  Cardiovascular:  PMI normal, S1-S2 normal, No  Murmurs, thrills, gallops, rubs   Regular rhythm  GI:  Abdomen soft nontender   No hepatosplenomegaly, adenopathy, ascites,or rebound tenderness  Extremities: No edema, normal pulses, no calf tenderness, no joint deformities, no venous disease   Integument:  No skin rashes or ulceration  Lymphatic:  No cervical or inguinal lymphadenopathy  Neurologic:  Patient is awake alert, responding to command, well-oriented to time and place and person moving all extremities    LABORATORY RESULTS:    Results from last 7 days  Lab Units 09/08/18 0446 09/07/18  2311 09/07/18  1820   TROPONIN I ng/mL <0 02 <0 02 <0 02     CBC with diff:   Results from last 7 days  Lab Units 09/08/18  0446 09/07/18  2311 09/07/18  1820   WBC Thousand/uL 7 46  --  9 28   HEMOGLOBIN g/dL 13 1  --  13 6   HEMATOCRIT % 38 8  --  40 1   MCV fL 93  --  93   PLATELETS Thousands/uL 198 209 225   MCH pg 31 5  --  31 6   MCHC g/dL 33 8  --  33 9   RDW % 12 6  --  12 6   MPV fL 9 9 9 8 10 4   NRBC AUTO /100 WBCs 0  --  0       CMP:  Results from last 7 days  Lab Units 09/08/18  0446 09/07/18  1820   SODIUM mmol/L 140 137   POTASSIUM mmol/L 3 7 3 3*   CHLORIDE mmol/L 104 100   CO2 mmol/L 33* 32   BUN mg/dL 12 14   CREATININE mg/dL 1 00 1 16   CALCIUM mg/dL 9 3 9 0   AST U/L  --  24   ALT U/L  --  51   ALK PHOS U/L  --  68   EGFR ml/min/1 73sq m 81 68       BMP:  Results from last 7 days  Lab Units 09/08/18  0446 09/07/18  1820   SODIUM mmol/L 140 137   POTASSIUM mmol/L 3 7 3 3*   CHLORIDE mmol/L 104 100   CO2 mmol/L 33* 32   BUN mg/dL 12 14   CREATININE mg/dL 1 00 1 16   CALCIUM mg/dL 9 3 9 0 Results from last 7 days  Lab Units 09/08/18  0446   MAGNESIUM mg/dL 2 1               Results from last 7 days  Lab Units 09/07/18  1820   INR  1 03       Lipid Profile:   No results found for: CHOL  Lab Results   Component Value Date    HDL 40 09/08/2018     Lab Results   Component Value Date    LDLCALC 58 09/08/2018     Lab Results   Component Value Date    TRIG 99 09/08/2018       Cardiac testing:   No results found for this or any previous visit  No results found for this or any previous visit  No procedure found  No results found for this or any previous visit  Imaging:   I have personally reviewed pertinent reports  EKG reviewed personally:  NSR    Assessment/Plan:  1  Chest pain:  -Serial troponins negative x3  -EKG shows NSR  -CXR unremarkable  -Had ECHO done at cardiologist office in [place] within the past year  States that it was unremarkable  -Was scheduled for nuclear stress test   -Will order Nuclear stress test for Monday to r/o coronary ischemia  -Continue ASA, statin, Toprol    2  HTN: Stable, continue present regimen    3  HLD: Continue statin    Code Status: Level 1 - Full Code    Counseling / Coordination of Care  Total floor / unit time spent today 35 minutes  Greater than 50% of total time was spent with the patient and / or family counseling and / or coordination of care  A description of the counseling / coordination of care: Review of history, current assessment, development of a plan      Jeancarlos Obrien PA-C  9/8/2018,8:28 AM

## 2018-09-08 NOTE — PLAN OF CARE
CARDIOVASCULAR - ADULT     Maintains optimal cardiac output and hemodynamic stability Progressing     Absence of cardiac dysrhythmias or at baseline rhythm Progressing        METABOLIC, FLUID AND ELECTROLYTES - ADULT     Electrolytes maintained within normal limits Progressing     Fluid balance maintained Progressing     Glucose maintained within target range Progressing        MUSCULOSKELETAL - ADULT     Maintain or return mobility to safest level of function Progressing     Maintain proper alignment of affected body part Progressing        NEUROSENSORY - ADULT     Achieves stable or improved neurological status Progressing     Achieves maximal functionality and self care Progressing        Potential for Falls     Patient will remain free of falls Progressing        RESPIRATORY - ADULT     Achieves optimal ventilation and oxygenation Progressing        SKIN/TISSUE INTEGRITY - ADULT     Skin integrity remains intact Progressing     Incision(s), wounds(s) or drain site(s) healing without S/S of infection Progressing     Oral mucous membranes remain intact Progressing

## 2018-09-09 PROCEDURE — 99226 PR SBSQ OBSERVATION CARE/DAY 35 MINUTES: CPT | Performed by: FAMILY MEDICINE

## 2018-09-09 PROCEDURE — 99213 OFFICE O/P EST LOW 20 MIN: CPT | Performed by: INTERNAL MEDICINE

## 2018-09-09 RX ADMIN — HEPARIN SODIUM 5000 UNITS: 5000 INJECTION, SOLUTION INTRAVENOUS; SUBCUTANEOUS at 05:43

## 2018-09-09 RX ADMIN — HEPARIN SODIUM 5000 UNITS: 5000 INJECTION, SOLUTION INTRAVENOUS; SUBCUTANEOUS at 21:11

## 2018-09-09 RX ADMIN — LOSARTAN POTASSIUM: 50 TABLET, FILM COATED ORAL at 08:33

## 2018-09-09 RX ADMIN — PANTOPRAZOLE SODIUM 40 MG: 40 TABLET, DELAYED RELEASE ORAL at 05:43

## 2018-09-09 RX ADMIN — ATORVASTATIN CALCIUM 40 MG: 40 TABLET, FILM COATED ORAL at 08:33

## 2018-09-09 RX ADMIN — PSYLLIUM HUSK 1 PACKET: 3.4 POWDER ORAL at 08:33

## 2018-09-09 RX ADMIN — METOPROLOL SUCCINATE 25 MG: 25 TABLET, EXTENDED RELEASE ORAL at 08:33

## 2018-09-09 RX ADMIN — HEPARIN SODIUM 5000 UNITS: 5000 INJECTION, SOLUTION INTRAVENOUS; SUBCUTANEOUS at 15:34

## 2018-09-09 RX ADMIN — ASPIRIN 81 MG: 81 TABLET, COATED ORAL at 08:33

## 2018-09-09 RX ADMIN — MELATONIN TAB 3 MG 6 MG: 3 TAB at 21:12

## 2018-09-09 NOTE — SOCIAL WORK
CM met with patient at bedside  Patient was informed of OBS status  Patient was given a copy of OBS notice  Signed OBS is in the chart  Nurse and SLIM notified

## 2018-09-09 NOTE — ASSESSMENT & PLAN NOTE
Pre hospital, chest pressure nonradiating, associated with nausea and dizziness  Patient does have previous history of chest pain, with recent abnormal stress test  Trop negative  Chest x-ray reviewed, no acute findings  Continue Telemetry  Per  Cardiology evaluation nuclear stress test tomorrow  Monitor associated risk factors

## 2018-09-09 NOTE — CASE MANAGEMENT
Continued Stay Review    Date: 9/9/19 @ 0849    Vital Signs: /78 (BP Location: Left arm)   Pulse 57   Temp 98 2 °F (36 8 °C) (Oral)   Resp 18   Ht 6' 5" (1 956 m)   Wt 104 kg (230 lb)   SpO2 98%   BMI 27 27 kg/m² 0/10 pain    Medications:   Scheduled Meds:   Current Facility-Administered Medications:  acetaminophen 650 mg Oral Q4H PRN   aspirin 81 mg Oral Daily   atorvastatin 40 mg Oral Daily   heparin (porcine) 5,000 Units Subcutaneous Q8H [address]   losartan potassium-hydrochlorothiazide (HYZAAR 100/25) combo dose  Oral Daily   melatonin 6 mg Oral HS   metoprolol succinate 25 mg Oral Daily   ondansetron 4 mg Intravenous Q6H PRN   pantoprazole 40 mg Oral Early Morning   psyllium 1 packet Oral Daily       Abnormal Labs/Diagnostic Results:     Stress test pending  Age/Sex: 66 y o  male     Assessment/Plan:           * Chest pain   Assessment & Plan     Pre hospital, chest pressure nonradiating, associated with nausea and dizziness  Patient does have previous history of chest pain, with recent abnormal stress test  Trop negative  Chest x-ray reviewed, no acute findings  Continue Telemetry  Per  Cardiology evaluation nuclear stress test tomorrow  Monitor associated risk factors              GERD (gastroesophageal reflux disease)   Assessment & Plan     Continue PPI          Hypokalemia   Assessment & Plan     Improved after replacement           Hypertension   Assessment & Plan     Blood pressure stable  Continue Lopressor, Hyzaar  Continue to monitor              VTE Pharmacologic Prophylaxis:   Pharmacologic: Heparin  Mechanical VTE Prophylaxis in Place:  Yes       Current Length of Stay: 0 day(s)     Current Patient Status: Observation   Certification Statement: The patient will continue to require additional inpatient hospital stay due to acute above conditions      Discharge Plan: TBD    ==========================================================    Continued Stay Review    Date: 9/9/8 @ 1709    Vital Signs: /69 (BP Location: Left arm)   Pulse 93   Temp 98 4 °F (36 9 °C) (Oral)   Resp 18   Ht 6' 5" (1 956 m)   Wt 104 kg (230 lb)   SpO2 95%   BMI 27 27 kg/m² 0/10 pain    Medications:   Scheduled Meds:   Current Facility-Administered Medications:  acetaminophen 650 mg Oral Q4H PRN   aspirin 81 mg Oral Daily   atorvastatin 40 mg Oral Daily   heparin (porcine) 5,000 Units Subcutaneous Q8H [address]   losartan potassium-hydrochlorothiazide (HYZAAR 100/25) combo dose  Oral Daily   melatonin 6 mg Oral HS   metoprolol succinate 25 mg Oral Daily   ondansetron 4 mg Intravenous Q6H PRN   pantoprazole 40 mg Oral Early Morning   psyllium 1 packet Oral Daily       Abnormal Labs/Diagnostic Results:     9/10 Stress test pending  Age/Sex: 66 y o  male     Discharge Plan: TBD                Thank you,  [address]  Utilization Review Department  Phone: [phone]; Fax [phone]  ATTENTION: Please call with any questions or concerns to [phone]  and carefully follow the prompts so that you are directed to the right person  Send all requests for admission clinical reviews, approved or denied determinations and any other requests to fax [phone]   All voicemails are confidential

## 2018-09-09 NOTE — PROGRESS NOTES
Progress Note - Any Courtney 1955, 66 y o  male MRN: [id number]    Unit/Bed#: -01 Encounter: [id number]    Primary Care Provider: No primary care provider on file  Date and time admitted to hospital: 2018  5:35 PM        * Chest pain   Assessment & Plan    Pre hospital, chest pressure nonradiating, associated with nausea and dizziness  Patient does have previous history of chest pain, with recent abnormal stress test  Trop negative  Chest x-ray reviewed, no acute findings  Continue Telemetry  Per  Cardiology evaluation nuclear stress test tomorrow  Monitor associated risk factors  GERD (gastroesophageal reflux disease)   Assessment & Plan    Continue PPI        Hypokalemia   Assessment & Plan    Improved after replacement  Hypertension   Assessment & Plan    Blood pressure stable  Continue Lopressor, Hyzaar  Continue to monitor  VTE Pharmacologic Prophylaxis:   Pharmacologic: Heparin  Mechanical VTE Prophylaxis in Place: Yes    Patient Centered Rounds: I have performed bedside rounds with nursing staff today  Discussions with Specialists or Other Care Team Provider:     Education and Discussions with Family / Patient: patient    Time Spent for Care: 20 minutes  More than 50% of total time spent on counseling and coordination of care as described above  Current Length of Stay: 0 day(s)    Current Patient Status: Observation   Certification Statement: The patient will continue to require additional inpatient hospital stay due to acute above conditions    Discharge Plan:   Depends on clinical course, pending the nuclear stress test    Code Status: Level 1 - Full Code      Subjective:   Patient denies any chest pain, short of breath palpitation  No events during the night     Objective:     Vitals:   Temp (24hrs), Av °F (36 7 °C), Min:97 6 °F (36 4 °C), Max:98 2 °F (36 8 °C)    HR:  [57-66] 57  Resp:  [17-18] 18  BP: (129-145)/(72-82) 145/78  SpO2:  [94 %-98 %] 98 %  Body mass index is 27 27 kg/m²  Input and Output Summary (last 24 hours): Intake/Output Summary (Last 24 hours) at 09/09/18 1107  Last data filed at 09/09/18 1001   Gross per 24 hour   Intake              551 ml   Output             2400 ml   Net            -1849 ml       Physical Exam:     Physical Exam   Constitutional: He is oriented to person, place, and time  No distress  Cardiovascular: Normal rate, regular rhythm, normal heart sounds and intact distal pulses  Exam reveals no gallop  No murmur heard  Pulmonary/Chest: No respiratory distress  He has no wheezes  He has no rales  He exhibits no tenderness  Abdominal: Soft  Bowel sounds are normal  He exhibits no distension and no mass  There is no tenderness  There is no rebound and no guarding  Musculoskeletal: He exhibits no edema, tenderness or deformity  Neurological: He is alert and oriented to person, place, and time  No cranial nerve deficit  Coordination normal    Skin: Skin is warm  He is not diaphoretic  Additional Data:     Labs:      Results from last 7 days  Lab Units 09/08/18  0446   WBC Thousand/uL 7 46   HEMOGLOBIN g/dL 13 1   HEMATOCRIT % 38 8   PLATELETS Thousands/uL 198   NEUTROS PCT % 64   LYMPHS PCT % 24   MONOS PCT % 10   EOS PCT % 2       Results from last 7 days  Lab Units 09/08/18  0446 09/07/18  1820   SODIUM mmol/L 140 137   POTASSIUM mmol/L 3 7 3 3*   CHLORIDE mmol/L 104 100   CO2 mmol/L 33* 32   BUN mg/dL 12 14   CREATININE mg/dL 1 00 1 16   CALCIUM mg/dL 9 3 9 0   ALK PHOS U/L  --  68   ALT U/L  --  51   AST U/L  --  24       Results from last 7 days  Lab Units 09/07/18  1820   INR  1 03       * I Have Reviewed All Lab Data Listed Above  * Additional Pertinent Lab Tests Reviewed:  All Labs Within Last 24 Hours Reviewed    Imaging:    Imaging Reports Reviewed Today Include:   Imaging Personally Reviewed by Myself Includes:      Recent Cultures (last 7 days):           Last 24 Hours Medication List:     Current Facility-Administered Medications:  acetaminophen 650 mg Oral Q4H PRN LANCE Mota   aspirin 81 mg Oral Daily LANCE Mota   atorvastatin 40 mg Oral Daily LANCE Mota   heparin (porcine) 5,000 Units Subcutaneous Q8H [address] LANCE Mota   losartan potassium-hydrochlorothiazide (HYZAAR 100/25) combo dose  Oral Daily LANCE Mota   melatonin 6 mg Oral HS LANCE Mota   metoprolol succinate 25 mg Oral Daily LANCE Mota   ondansetron 4 mg Intravenous Q6H PRN LANCE Mota   pantoprazole 40 mg Oral Early Morning LANCE Mota   psyllium 1 packet Oral Daily John Farmer MD        Today, Patient Was Seen By: John Farmer MD    ** Please Note: Dragon 360 Dictation voice to text software may have been used in the creation of this document   **

## 2018-09-09 NOTE — PROGRESS NOTES
General Cardiology   Progress Note   Radha Pena 66 y o  male MRN: [id number]  Unit/Bed#: -01 Encounter: [id number]        Subjective:   No recurrence of chest pain since yesterday    Objective:   Vitals:  Vitals:    09/09/18 1100   BP: 121/73   Pulse: 65   Resp: 18   Temp: 98 2 °F (36 8 °C)   SpO2: 94%       Body mass index is 27 27 kg/m²  Systolic (54CGP), TJV:588 , Min:121 , INV:677     Diastolic (58MKD), DAM:90, Min:72, Max:82      Intake/Output Summary (Last 24 hours) at 09/09/18 1208  Last data filed at 09/09/18 1001   Gross per 24 hour   Intake              551 ml   Output             2400 ml   Net            -1849 ml     Weight (last 2 days)     Date/Time   Weight    09/07/18 1736  104 (230)              Telemetry Review: No significant arrhythmias seen on telemetry review  PHYSICAL EXAMS:  General:  Patient is not in acute distress, laying in the bed comfortably, awake, alert responding to commands  Head: Normocephalic, Atraumatic  HEENT: White sclera, pink conjunctiva,  PERRLA,pharynx benign  Neck:  Supple, no neck vein distention, carotids+2/+2 no bruits, thyromegaly, adenopathy  Respiratory: clear to P/A  Cardiovascular:  PMI normal, S1-S2 normal, No  Murmurs, thrills, gallops, rubs   Regular rhythm  GI:  Abdomen soft nontender   No hepatosplenomegaly, adenopathy, ascites,or rebound tenderness  Extremities: No edema, normal pulses, no calf tenderness, no joint deformities, no venous disease   Integument:  No skin rashes or ulceration  Lymphatic:  No cervical or inguinal lymphadenopathy  Neurologic:  Patient is awake alert, responding to command, well-oriented to time and place and person moving all extremities      LABORATORY RESULTS:    Results from last 7 days  Lab Units 09/08/18  0446 09/07/18  2311 09/07/18  1820   TROPONIN I ng/mL <0 02 <0 02 <0 02     CBC with diff:   Results from last 7 days  Lab Units 09/08/18  0446 09/07/18  2311 09/07/18  1820   WBC Thousand/uL 7 46  -- 9  28   HEMOGLOBIN g/dL 13 1  --  13 6   HEMATOCRIT % 38 8  --  40 1   MCV fL 93  --  93   PLATELETS Thousands/uL 198 209 225   MCH pg 31 5  --  31 6   MCHC g/dL 33 8  --  33 9   RDW % 12 6  --  12 6   MPV fL 9 9 9 8 10 4   NRBC AUTO /100 WBCs 0  --  0       CMP:  Results from last 7 days  Lab Units 09/08/18  0446 09/07/18  1820   SODIUM mmol/L 140 137   POTASSIUM mmol/L 3 7 3 3*   CHLORIDE mmol/L 104 100   CO2 mmol/L 33* 32   BUN mg/dL 12 14   CREATININE mg/dL 1 00 1 16   CALCIUM mg/dL 9 3 9 0   AST U/L  --  24   ALT U/L  --  51   ALK PHOS U/L  --  68   EGFR ml/min/1 73sq m 81 68       BMP:  Results from last 7 days  Lab Units 09/08/18  0446 09/07/18  1820   SODIUM mmol/L 140 137   POTASSIUM mmol/L 3 7 3 3*   CHLORIDE mmol/L 104 100   CO2 mmol/L 33* 32   BUN mg/dL 12 14   CREATININE mg/dL 1 00 1 16   CALCIUM mg/dL 9 3 9 0              Results from last 7 days  Lab Units 09/08/18  0446   MAGNESIUM mg/dL 2 1               Results from last 7 days  Lab Units 09/07/18  1820   INR  1 03       Lipid Profile:   No results found for: CHOL  Lab Results   Component Value Date    HDL 40 09/08/2018     Lab Results   Component Value Date    LDLCALC 58 09/08/2018     Lab Results   Component Value Date    TRIG 99 09/08/2018       Cardiac testing:   No results found for this or any previous visit  No results found for this or any previous visit  No results found for this or any previous visit  No procedure found  No results found for this or any previous visit  Meds/Allergies   all current active meds have been reviewed  Prescriptions Prior to Admission   Medication    aspirin (ECOTRIN LOW STRENGTH) 81 mg EC tablet    atorvastatin (LIPITOR) 40 mg tablet    losartan-hydrochlorothiazide (HYZAAR) 100-25 MG per tablet    metoprolol succinate (TOPROL-XL) 50 mg 24 hr tablet    lansoprazole (PREVACID) 30 mg capsule            Assessment/Plan:  1   Chest pain:  -Serial troponins negative x3  -EKG shows NSR  -Had ECHO done at cardiologist office in [place] within the past year  States that it was unremarkable   -Will order Nuclear stress test for Monday to r/o coronary ischemia  -Continue ASA, statin, Toprol     2  HTN: Stable, continue present regimen     3  HLD: Continue statin    Counseling / Coordination of Care  Total floor / unit time spent today 20 minutes  Greater than 50% of total time was spent with the patient and / or family counseling and / or coordination of care  ** Please Note: Dragon 360 Dictation voice to text software may have been used in the creation of this document   **

## 2018-09-10 ENCOUNTER — APPOINTMENT (OUTPATIENT)
Dept: NUCLEAR MEDICINE | Facility: HOSPITAL | Age: 63
End: 2018-09-10
Payer: COMMERCIAL

## 2018-09-10 ENCOUNTER — APPOINTMENT (OUTPATIENT)
Dept: NON INVASIVE DIAGNOSTICS | Facility: HOSPITAL | Age: 63
End: 2018-09-10
Payer: COMMERCIAL

## 2018-09-10 VITALS
SYSTOLIC BLOOD PRESSURE: 148 MMHG | HEART RATE: 77 BPM | WEIGHT: 230 LBS | HEIGHT: 77 IN | OXYGEN SATURATION: 97 % | RESPIRATION RATE: 18 BRPM | TEMPERATURE: 98.6 F | DIASTOLIC BLOOD PRESSURE: 80 MMHG | BODY MASS INDEX: 27.16 KG/M2

## 2018-09-10 LAB
ARRHY DURING EX: NORMAL
CHEST PAIN STATEMENT: NORMAL
MAX DIASTOLIC BP: 89 MMHG
MAX HEART RATE: 113 BPM
MAX PREDICTED HEART RATE: 159 BPM
MAX. SYSTOLIC BP: 165 MMHG
PROTOCOL NAME: NORMAL
REASON FOR TERMINATION: NORMAL
TARGET HR FORMULA: NORMAL
TEST INDICATION: NORMAL
TIME IN EXERCISE PHASE: NORMAL

## 2018-09-10 PROCEDURE — 93016 CV STRESS TEST SUPVJ ONLY: CPT | Performed by: INTERNAL MEDICINE

## 2018-09-10 PROCEDURE — 78452 HT MUSCLE IMAGE SPECT MULT: CPT | Performed by: INTERNAL MEDICINE

## 2018-09-10 PROCEDURE — 78452 HT MUSCLE IMAGE SPECT MULT: CPT

## 2018-09-10 PROCEDURE — 93017 CV STRESS TEST TRACING ONLY: CPT

## 2018-09-10 PROCEDURE — 93018 CV STRESS TEST I&R ONLY: CPT | Performed by: INTERNAL MEDICINE

## 2018-09-10 PROCEDURE — A9502 TC99M TETROFOSMIN: HCPCS

## 2018-09-10 PROCEDURE — 99226 PR SBSQ OBSERVATION CARE/DAY 35 MINUTES: CPT | Performed by: FAMILY MEDICINE

## 2018-09-10 RX ADMIN — REGADENOSON 0.4 MG: 0.08 INJECTION, SOLUTION INTRAVENOUS at 11:34

## 2018-09-10 RX ADMIN — ASPIRIN 81 MG: 81 TABLET, COATED ORAL at 08:21

## 2018-09-10 RX ADMIN — PANTOPRAZOLE SODIUM 40 MG: 40 TABLET, DELAYED RELEASE ORAL at 05:34

## 2018-09-10 RX ADMIN — HEPARIN SODIUM 5000 UNITS: 5000 INJECTION, SOLUTION INTRAVENOUS; SUBCUTANEOUS at 15:42

## 2018-09-10 RX ADMIN — ACETAMINOPHEN 650 MG: 325 TABLET, FILM COATED ORAL at 15:42

## 2018-09-10 RX ADMIN — ATORVASTATIN CALCIUM 40 MG: 40 TABLET, FILM COATED ORAL at 08:21

## 2018-09-10 RX ADMIN — HEPARIN SODIUM 5000 UNITS: 5000 INJECTION, SOLUTION INTRAVENOUS; SUBCUTANEOUS at 05:35

## 2018-09-10 RX ADMIN — LOSARTAN POTASSIUM: 50 TABLET, FILM COATED ORAL at 08:21

## 2018-09-10 NOTE — ASSESSMENT & PLAN NOTE
Pre hospital, chest pressure nonradiating, associated with nausea and dizziness  Patient does have previous history of chest pain, with recent abnormal stress test  Trop negative  Chest x-ray reviewed, no acute findings  Continue Telemetry  Per  Cardiology evaluation nuclear stress test today  Discharge planning or further assessment after study   Monitor associated risk factors

## 2018-09-10 NOTE — PROGRESS NOTES
Progress Note - Alysa Ascencio 1955, 66 y o  male MRN: [id number]    Unit/Bed#: -01 Encounter: [id number]    Primary Care Provider: No primary care provider on file  Date and time admitted to hospital: 2018  5:35 PM        * Chest pain   Assessment & Plan    Pre hospital, chest pressure nonradiating, associated with nausea and dizziness  Patient does have previous history of chest pain, with recent abnormal stress test  Trop negative  Chest x-ray reviewed, no acute findings  Continue Telemetry  Per  Cardiology evaluation nuclear stress test today  Discharge planning or further assessment after study   Monitor associated risk factors  GERD (gastroesophageal reflux disease)   Assessment & Plan    Continue PPI        Hypokalemia   Assessment & Plan    Improved after replacement  Hypertension   Assessment & Plan    Blood pressure stable  Continue Lopressor, Hyzaar  Continue to monitor  VTE Pharmacologic Prophylaxis:   Pharmacologic: Heparin  Mechanical VTE Prophylaxis in Place: Yes    Patient Centered Rounds: I have performed bedside rounds with nursing staff today  Discussions with Specialists or Other Care Team Provider: cardiology    Education and Discussions with Family / Patient: patient    Time Spent for Care: 20 minutes  More than 50% of total time spent on counseling and coordination of care as described above  Current Length of Stay: 0 day(s)    Current Patient Status: Observation   Certification Statement: The patient will continue to require additional inpatient hospital stay due to acute above conditions    Discharge Plan: pending cardiology study    Code Status: Level 1 - Full Code      Subjective:   Patient states he is ok, no chest pain or events overnight       Objective:     Vitals:   Temp (24hrs), Av 2 °F (36 8 °C), Min:98 °F (36 7 °C), Max:98 4 °F (36 9 °C)    HR:  [56-93] 65  Resp:  [18] 18  BP: (117-143)/(69-80) 138/75  SpO2:  [94 %-96 %] 94 %  Body mass index is 27 27 kg/m²  Input and Output Summary (last 24 hours): Intake/Output Summary (Last 24 hours) at 09/10/18 0912  Last data filed at 09/10/18 7232   Gross per 24 hour   Intake              560 ml   Output             3250 ml   Net            -2690 ml       Physical Exam:     Physical Exam   Constitutional: He is oriented to person, place, and time  No distress  Cardiovascular: Normal rate, regular rhythm, normal heart sounds and intact distal pulses  Exam reveals no gallop and no friction rub  No murmur heard  Pulmonary/Chest: Effort normal and breath sounds normal  No respiratory distress  He has no wheezes  He has no rales  He exhibits no tenderness  Abdominal: Soft  Bowel sounds are normal  He exhibits no distension and no mass  There is no tenderness  There is no rebound and no guarding  Genitourinary: Rectal exam shows guaiac negative stool  Neurological: He is alert and oriented to person, place, and time  He has normal reflexes  He displays normal reflexes  No cranial nerve deficit  He exhibits normal muscle tone  Coordination normal    Skin: Skin is warm  He is not diaphoretic  Psychiatric: He has a normal mood and affect  Additional Data:     Labs:      Results from last 7 days  Lab Units 09/08/18  0446   WBC Thousand/uL 7 46   HEMOGLOBIN g/dL 13 1   HEMATOCRIT % 38 8   PLATELETS Thousands/uL 198   NEUTROS PCT % 64   LYMPHS PCT % 24   MONOS PCT % 10   EOS PCT % 2       Results from last 7 days  Lab Units 09/08/18  0446 09/07/18  1820   SODIUM mmol/L 140 137   POTASSIUM mmol/L 3 7 3 3*   CHLORIDE mmol/L 104 100   CO2 mmol/L 33* 32   BUN mg/dL 12 14   CREATININE mg/dL 1 00 1 16   CALCIUM mg/dL 9 3 9 0   ALK PHOS U/L  --  68   ALT U/L  --  51   AST U/L  --  24       Results from last 7 days  Lab Units 09/07/18  1820   INR  1 03       * I Have Reviewed All Lab Data Listed Above  * Additional Pertinent Lab Tests Reviewed:  All Labs Within Last 24 Hours Reviewed    Imaging:    Imaging Reports Reviewed Today Include:   Imaging Personally Reviewed by Myself Includes:      Recent Cultures (last 7 days):           Last 24 Hours Medication List:     Current Facility-Administered Medications:  acetaminophen 650 mg Oral Q4H PRN LANCE Mota   aspirin 81 mg Oral Daily LANCE Mota   atorvastatin 40 mg Oral Daily LANCE Mota   heparin (porcine) 5,000 Units Subcutaneous Q8H [address] LANCE Mota   losartan potassium-hydrochlorothiazide (HYZAAR 100/25) combo dose  Oral Daily LANCE Mota   melatonin 6 mg Oral HS LANCE Mota   metoprolol succinate 25 mg Oral Daily LANCE Mota   ondansetron 4 mg Intravenous Q6H PRN LANCE Mota   pantoprazole 40 mg Oral Early Morning LANCE Mota   psyllium 1 packet Oral Daily Alen Favre, MD        Today, Patient Was Seen By: Alen Favre, MD    ** Please Note: Dragon 360 Dictation voice to text software may have been used in the creation of this document   **

## 2018-09-10 NOTE — CASE MANAGEMENT
Thank you,  Maxine Nasirn  Utilization Review Department  Phone: [phone]; Fax [phone]  ATTENTION: Please call with any questions or concerns to [phone]  and carefully follow the prompts so that you are directed to the right person  Send all requests for admission clinical reviews, approved or denied determinations and any other requests to fax [phone]  All voicemails are confidential      Continued Stay Review    Date: 9/10/18     OBSERVATION        Age/Sex: 66 y o  male initially admitted under OBS on 9/7/18 due to chest pain  Assessment/Plan: was awaiting nuc stress test, which has been completed as of 1601   Normal       Vital Signs: /75 (BP Location: Left arm)   Pulse 65   Temp 98 3 °F (36 8 °C) (Oral)   Resp 18   Ht 6' 5" (1 956 m)   Wt 104 kg (230 lb)   SpO2 94%   BMI 27 27 kg/m²     Medications:   Scheduled Meds:   Current Facility-Administered Medications:  acetaminophen 650 mg Oral Q4H PRN   aspirin 81 mg Oral Daily   atorvastatin 40 mg Oral Daily   heparin (porcine) 5,000 Units Subcutaneous Q8H [facility]   losartan potassium-hydrochlorothiazide (HYZAAR 100/25) combo dose  Oral Daily   melatonin 6 mg Oral HS   metoprolol succinate 25 mg Oral Daily   ondansetron 4 mg Intravenous Q6H PRN   pantoprazole 40 mg Oral Early Morning   psyllium 1 packet Oral Daily     Abnormal Labs/Diagnostic Results: 9/10: nuc stress: wnl  Discharge Plan: being DC today

## 2018-09-10 NOTE — SOCIAL WORK
CM met with pt and wife Jamie Espino at bedside  Pt lives in [place] in a 2 story house  He has no problem navigating steps and is independent with ADL's  He uses no DME's  He has never been in rehab or used [facility] services  He uses a pharmacy in [place] and has no problem with his co-pays  Denies substance abuse or mental health issues  He works full time  Pt to be discharged home w/no needs  His wife is going to transport him to his car and he is going to drive himself home to [place]

## 2018-09-10 NOTE — PLAN OF CARE
Problem: DISCHARGE PLANNING - CARE MANAGEMENT  Goal: Discharge to post-acute care or home with appropriate resources  INTERVENTIONS:  - Conduct assessment to determine patient/family and health care team treatment goals, and need for post-acute services based on payer coverage, community resources, and patient preferences, and barriers to discharge  - Address psychosocial, clinical, and financial barriers to discharge as identified in assessment in conjunction with the patient/family and health care team  - Arrange appropriate level of post-acute services according to patient's   needs and preference and payer coverage in collaboration with the physician and health care team  - Communicate with and update the patient/family, physician, and health care team regarding progress on the discharge plan  - Arrange appropriate transportation to post-acute venues  Outcome: Completed Date Met: 09/10/18  CM met with pt and wife Casandra Yu at bedside  Pt lives in [place] in a 2 story house  He has no problem navigating steps and is independent with ADL's  He uses no DME's  He has never been in rehab or used [facility] services  He uses a pharmacy in [place] and has no problem with his co-pays  Denies substance abuse or mental health issues  He works full time  Pt to be discharged home w/no needs  His wife is going to transport him to his car and he is going to drive himself home to [place]